# Patient Record
Sex: FEMALE | Race: BLACK OR AFRICAN AMERICAN | Employment: OTHER | URBAN - METROPOLITAN AREA
[De-identification: names, ages, dates, MRNs, and addresses within clinical notes are randomized per-mention and may not be internally consistent; named-entity substitution may affect disease eponyms.]

---

## 2017-11-29 ENCOUNTER — TRANSFERRED RECORDS (OUTPATIENT)
Dept: HEALTH INFORMATION MANAGEMENT | Facility: CLINIC | Age: 22
End: 2017-11-29

## 2017-11-29 LAB — PAP-ABSTRACT: NORMAL

## 2019-02-14 ENCOUNTER — PRENATAL OFFICE VISIT (OUTPATIENT)
Dept: MIDWIFE SERVICES | Facility: CLINIC | Age: 24
End: 2019-02-14
Payer: COMMERCIAL

## 2019-02-14 ENCOUNTER — PRENATAL OFFICE VISIT (OUTPATIENT)
Dept: NURSING | Facility: CLINIC | Age: 24
End: 2019-02-14
Payer: COMMERCIAL

## 2019-02-14 VITALS
DIASTOLIC BLOOD PRESSURE: 74 MMHG | BODY MASS INDEX: 26.43 KG/M2 | HEART RATE: 99 BPM | WEIGHT: 154 LBS | SYSTOLIC BLOOD PRESSURE: 103 MMHG

## 2019-02-14 VITALS
TEMPERATURE: 98.8 F | HEART RATE: 99 BPM | DIASTOLIC BLOOD PRESSURE: 74 MMHG | WEIGHT: 152.8 LBS | HEIGHT: 64 IN | BODY MASS INDEX: 26.09 KG/M2 | SYSTOLIC BLOOD PRESSURE: 103 MMHG

## 2019-02-14 DIAGNOSIS — Z34.00 SUPERVISION OF NORMAL FIRST PREGNANCY: Primary | ICD-10-CM

## 2019-02-14 DIAGNOSIS — Z34.02 ENCOUNTER FOR SUPERVISION OF NORMAL FIRST PREGNANCY, SECOND TRIMESTER: ICD-10-CM

## 2019-02-14 DIAGNOSIS — E55.9 VITAMIN D DEFICIENCY: Primary | ICD-10-CM

## 2019-02-14 DIAGNOSIS — Z34.90 ENCOUNTER FOR SUPERVISION OF NORMAL PREGNANCY, ANTEPARTUM, UNSPECIFIED GRAVIDITY: ICD-10-CM

## 2019-02-14 LAB
ALBUMIN UR-MCNC: NEGATIVE MG/DL
APPEARANCE UR: CLEAR
BILIRUB UR QL STRIP: NEGATIVE
COLOR UR AUTO: YELLOW
GLUCOSE UR STRIP-MCNC: 100 MG/DL
HGB UR QL STRIP: NEGATIVE
KETONES UR STRIP-MCNC: NEGATIVE MG/DL
LEUKOCYTE ESTERASE UR QL STRIP: ABNORMAL
NITRATE UR QL: NEGATIVE
PH UR STRIP: 5.5 PH (ref 5–7)
SOURCE: ABNORMAL
SP GR UR STRIP: <=1.005 (ref 1–1.03)
UROBILINOGEN UR STRIP-ACNC: 0.2 EU/DL (ref 0.2–1)

## 2019-02-14 PROCEDURE — 81003 URINALYSIS AUTO W/O SCOPE: CPT | Performed by: ADVANCED PRACTICE MIDWIFE

## 2019-02-14 PROCEDURE — 99207 ZZC NO CHARGE NURSE ONLY: CPT

## 2019-02-14 PROCEDURE — 87086 URINE CULTURE/COLONY COUNT: CPT | Performed by: ADVANCED PRACTICE MIDWIFE

## 2019-02-14 PROCEDURE — 99207 ZZC FIRST OB VISIT: CPT | Performed by: ADVANCED PRACTICE MIDWIFE

## 2019-02-14 RX ORDER — PNV NO.95/FERROUS FUM/FOLIC AC 28MG-0.8MG
1 TABLET ORAL DAILY
Refills: 12 | Status: ON HOLD | COMMUNITY
Start: 2018-12-05 | End: 2019-05-16

## 2019-02-14 RX ORDER — FERROUS GLUCONATE 324(38)MG
TABLET ORAL
Refills: 3 | Status: ON HOLD | COMMUNITY
Start: 2018-12-05 | End: 2019-05-16

## 2019-02-14 RX ORDER — CHOLECALCIFEROL (VITAMIN D3) 50 MCG
1 TABLET ORAL DAILY
Qty: 90 TABLET | Refills: 3 | Status: SHIPPED | OUTPATIENT
Start: 2019-02-14 | End: 2020-02-14

## 2019-02-14 SDOH — SOCIAL STABILITY: SOCIAL INSECURITY: WITHIN THE LAST YEAR, HAVE YOU BEEN HUMILIATED OR EMOTIONALLY ABUSED IN OTHER WAYS BY YOUR PARTNER OR EX-PARTNER?: NO

## 2019-02-14 SDOH — HEALTH STABILITY: PHYSICAL HEALTH: ON AVERAGE, HOW MANY MINUTES DO YOU ENGAGE IN EXERCISE AT THIS LEVEL?: 0 MIN

## 2019-02-14 SDOH — SOCIAL STABILITY: SOCIAL INSECURITY: WITHIN THE LAST YEAR, HAVE YOU BEEN AFRAID OF YOUR PARTNER OR EX-PARTNER?: NO

## 2019-02-14 SDOH — HEALTH STABILITY: MENTAL HEALTH
STRESS IS WHEN SOMEONE FEELS TENSE, NERVOUS, ANXIOUS, OR CAN'T SLEEP AT NIGHT BECAUSE THEIR MIND IS TROUBLED. HOW STRESSED ARE YOU?: NOT AT ALL

## 2019-02-14 SDOH — HEALTH STABILITY: PHYSICAL HEALTH: ON AVERAGE, HOW MANY DAYS PER WEEK DO YOU ENGAGE IN MODERATE TO STRENUOUS EXERCISE (LIKE A BRISK WALK)?: 0 DAYS

## 2019-02-14 SDOH — HEALTH STABILITY: MENTAL HEALTH: HOW OFTEN DO YOU HAVE A DRINK CONTAINING ALCOHOL?: NEVER

## 2019-02-14 SDOH — SOCIAL STABILITY: SOCIAL INSECURITY
WITHIN THE LAST YEAR, HAVE TO BEEN RAPED OR FORCED TO HAVE ANY KIND OF SEXUAL ACTIVITY BY YOUR PARTNER OR EX-PARTNER?: NO

## 2019-02-14 SDOH — SOCIAL STABILITY: SOCIAL INSECURITY
WITHIN THE LAST YEAR, HAVE YOU BEEN KICKED, HIT, SLAPPED, OR OTHERWISE PHYSICALLY HURT BY YOUR PARTNER OR EX-PARTNER?: NO

## 2019-02-14 SDOH — SOCIAL STABILITY: SOCIAL NETWORK: ARE YOU MARRIED, WIDOWED, DIVORCED, SEPARATED, NEVER MARRIED, OR LIVING WITH A PARTNER?: MARRIED

## 2019-02-14 ASSESSMENT — MIFFLIN-ST. JEOR: SCORE: 1423.1

## 2019-02-14 NOTE — PROGRESS NOTES
24w0d  Feeling well. Late to care. Had new OB teaching today. MFM ultrasound comprehensive ordered r/t late gestation. First baby. FOB, Mina here and supportive, speaks good english. Amina is learning english and understands a bunch, but uses an  for more complex medical questions. She reports having a healthy diet and cooks homemade foods. Mina states that she does not eat enough. We discussed weight gain and nutrition and exercise.     Will wait until after MFM ultrasound to make sure she is 24 wks and do GCT in 2 wks when she returns to clinic.     Amina Branham is a 23 year old female, -American and Somalian,     Pt presents to clinic today for a NOB visit.  Patient's last menstrual period was 2018.. Estimated Date of Delivery: 2019 is calculated from lmp alone     She has not had bleeding since her LMP.   She has not had nausea. Weight loss has not occurred.   This was a planned pregnancy.   WIL is involved,  Cesar     OTHER CONCERNS:     Pt's hx of HSV is negative    ============================================  PERSONAL/SOCIAL HISTORY  Lives lives with their family.  Employment: Student.  Her job involves light activity .  Exercises sporadic or irregular exercise  Pt denies abuse and feels safe in her home and relationships.     REVIEW OF SYSTEMS  C: NEGATIVE for fever, chills, change in weight  I: NEGATIVE for worrisome rashes, moles or lesions  E/M: NEGATIVE for ear, mouth and throat problems  R: NEGATIVE for significant cough or SOB  CV: NEGATIVE for chest pain, palpitations or peripheral edema  GI: NEGATIVE for nausea, abdominal pain, heartburn, or change in bowel habits  : NEGATIVE for frequency, dysuria, or hematuria  PSYCHIATRIC:  NEGATIVE for depression, anxiety or other mental health concerns    PHYSICAL EXAM:  /74   Pulse 99   Wt 69.9 kg (154 lb)   LMP 2018   BMI 26.43 kg/m    BMI- Body mass index is 26.43 kg/m ., RECOMMENDED WEIGHT GAIN:  15-25 lbs.  GENERAL:  Pleasant pregnant female, alert, cooperative and well groomed.  SKIN:  Warm and dry, without lesions or rashes  HEAD: Symmetrical features.  MOUTH:  Buccal mucosa pink, moist without lesions.  Teeth in good repair.    NECK:  Thyroid without enlargement and nodules.  Lymph nodes not palpable.   LUNGS:  Clear to auscultation.      HEART:  RRR without murmur.  ABDOMEN: Soft without masses , tenderness or organomegaly.  No CVA tenderness.  Uterus palpable at size equal to dates.  No scars noted..  MUSCULOSKELETAL:  Full range of motion  EXTREMITIES:  No edema. No significant varicosities.     PELVIC EXAM: Patient had many questions today. Will recommend pap at next visit.     GC/CHLAMYDIA CULTURE OBTAINED:PATIENT DECLINED    ASSESSMENT:  Intrauterine pregnancy at 24w0d weeks gestation.     (E55.9) Vitamin D deficiency  (primary encounter diagnosis)  Comment:   Plan: vitamin D3 (CHOLECALCIFEROL) 2000 units tablet             PLAN:  Oriented to CNM service.    Instructed on use of triage nurse line and contacting the on call CNM after hours for an urgent need such as fever, vagina bleeding, bladder or vaginal infection, rupture of membranes,  or term labor.      Discussed the indications, uses for and false positives for quad screen  and fetal survey ultrasound at 18-20 weeks gestation. Will inform us at the next visit if she wished to avail herself of these screens.    Instructed on best evidence for: weight gain for her weight and height for pregnancy; healthy diet; exercise and activity during pregnancy; and maintenance of a generally healthy lifestyle.     Discussed the harms, benefits, side effects and alternative therapies for current prescribed and OTC medications.    Darline Lopez CNM

## 2019-02-14 NOTE — PROGRESS NOTES
Important Information for Provider:     Patient  presents for new ob teaching and labs, first pregnancy, late care. Handouts reviewed and given. Ordered M ultrasound. Has NOB today with CNM    Prenatal OB Questionnaire  Patient supplied answers from flow sheet for:  Prenatal OB Questionnaire.  Past Medical History  Diabetes?: No  Hypertension : No  Heart disease, mitral valve prolapse or rheumatic fever?: No  An autoimmune disease such as lupus or rheumatoid arthritis?: No  Kidney disease or urinary tract infection?: No  Epilepsy, seizures or spells?: No  Migraine headaches?: No  A stroke or loss of function or sensation?: No  Any other neurological problems?: No  Have you ever been treated for depression?: No  Are you having problems with crying spells or loss of self-esteem?: No  Have you ever required psychiatric care?: No  Have you ever had hepatitis, liver disease or jaundice?: No  Have you been treated for blood clots in your veins, deep vein thromosis, inflammation in the veins, thrombosis, phlebitis, pulmonary embolism or varicosities?: No  Have you had excessive bleeding after surgery or dental work?: No  Do you bleed more than other women after a cut or scratch?: No  Do you have a history of anemia?: No  Have you ever had thyroid problems or taken thyroid medication?: No   Do you have any endocrine problems?: No  Have you ever been in a major accident or suffered serious trauma?: No  Within the last year, has anyone hit, slapped, kicked or otherwise hurt you?: No  In the last year, has anyone forced you to have sex when you didn't want to?: No    Past Medical History 2   Have you ever received a blood transfusion?: No  Would you refuse a blood transfusion if a doctor judged it to be medically necessary?: No   If you answered Yes, would you rather die than receive a blood transfusion?: No  If you answered Yes, is this for Lutheran reasons?: No  Does anyone in your home smoke?: No  Do you use tobacco  products?: No  Do you drink beer, wine or hard liquor?: No  Do you use any of the following: marijuana, speed, cocaine, heroin, hallucinogens or other drugs?: No   Is your blood type Rh negative?: No  Have you ever had abnormal antibodies in your blood?: No  Have you ever had asthma?: No  Have you ever had tuberculosis?: No  Do you have any allergies to drugs or over-the-counter medications?: No  Allergies: Dust Mites, Aspartame, Ethanol, Venlafaxine, Hydrochloride, Sertraline: No  Have you had any breast problems?: No  Have you ever ?: No  Have you had any gynecological surgical procedures such as cervical conization, a LEEP procedure, laser treatment, cryosurgery of the cervix or a dilation and curettage, etc?: No  Have you ever had any other surgical procedures?: No  Have you been hospitalized for a nonsurgical reason excluding normal delivery?: No  Have you ever had any anesthetic complications?: No  Have you ever had an abnormal pap smear?: No    Past Medical History (Continued)  Do you have a history of abnormalities of the uterus?: No  Did your mother take INGRIS or any other hormones when she was pregnant with you?: No  Did it take you more than a year to become pregnant?: No  Have you ever been evaluated or treated for infertility?: No  Is there a history of medical problems in your family, which you feel may be important to this pregnancy?: No  Do you have any other problems we have not asked about which you feel may be important to this pregnancy?: No    Symptoms since last menstrual period  Do you have any of the following symptoms: abdominal pain, blood in stools or urine, chest pain, shortness of breath, coughing or vomiting up blood, your heart racing or skipping beats, nausea and vomiting, pain on urination or vaginal discharge or bleed: (!) Yes  Will the patient be 35 years old or older at the time of delivery?: No    Has the patient, baby's father or anyone in either family had:  Thalassemia  (Turkish, Mexican, Mediterranean or  background only) and an MCV result less than 80?: No  Neural tube defect such as meningomyelocele, spina bifida or anencephaly?: No  Congenital heart defect?: No  Down's Syndrome?: No  Barrington-Sachs disease (Evangelical, Cajun, English-[place])?: No  Sickle cell disease or trait ()?: No  Hemophilia or other inherited problems of blood?: No  Muscular dystrophy?: No  Cystic fibrosis?: No  [place]'s chorea?: No  Mental retardation/autism?: No  If yes, was the person tested for fragile X?: No  Any other inherited genetic or chromosomal disorder?: No  Maternal metabolic disorder (e.g Insulin-dependent diabetes, PKU)?: No  A child with birth defects not listed above?: No  Recurrent pregnancy loss or stillbirth?: No   Has the patient had any medications/street drugs/alcohol since her last menstrual period?: No  Does the patient or baby's father have any other genetic risks?: No    Infection History   Do you object to being tested for Hepatitis B?: No  Do you object to being tested for HIV?: No   Do you feel that you are at high risk for coming in contact with the AIDS virus?: No  Have you ever been treated for tuberculosis?: No  Have you ever had a positive skin test for tuberculosis?: No  Do you live with someone who has tuberculosis?: No  Have you ever been exposed to tuberculosis?: No  Do you have genital herpes?: No  Does your partner have genital herpes?: No  Have you ever had gonorrhea, chlamydia, syphilis, venereal warts, trichomoniasis, pelvic inflammatory disease or any other sexually transmitted disease?: No  Do you know if you are a genital group B streptococcus carrier?: No  Have you had chicken pox/varicella?: No   Have you been vaccinated against chicken Pox?: No  Have you had any other infectious diseases?: No      Allergies as of 2/14/2019:    Allergies as of 02/14/2019     (No Known Allergies)       Current medications are:  Current Outpatient Medications    Medication Sig Dispense Refill     ferrous gluconate (FERGON) 324 (38 Fe) MG tablet TAKE 1 TABLET (324 MG) BY MOUTH DAILY.  3     Prenatal Vit-Fe Fumarate-FA (PRENATAL VITAMINS) 28-0.8 MG TABS Take 1 tablet by mouth daily  12         Early ultrasound screening tool:    Does patient have irregular periods?  No  Did patient use hormonal birth control in the three months prior to positive urine pregnancy test? No  Is the patient breastfeeding?  No  Is the patient 10 weeks or greater at time of education visit?  Yes

## 2019-02-15 ENCOUNTER — HEALTH MAINTENANCE LETTER (OUTPATIENT)
Age: 24
End: 2019-02-15

## 2019-02-15 LAB
BACTERIA SPEC CULT: NORMAL
SPECIMEN SOURCE: NORMAL

## 2019-02-19 ENCOUNTER — PRE VISIT (OUTPATIENT)
Dept: MATERNAL FETAL MEDICINE | Facility: CLINIC | Age: 24
End: 2019-02-19

## 2019-02-25 ENCOUNTER — OFFICE VISIT (OUTPATIENT)
Dept: MATERNAL FETAL MEDICINE | Facility: CLINIC | Age: 24
End: 2019-02-25
Attending: ADVANCED PRACTICE MIDWIFE
Payer: COMMERCIAL

## 2019-02-25 ENCOUNTER — HOSPITAL ENCOUNTER (OUTPATIENT)
Dept: ULTRASOUND IMAGING | Facility: CLINIC | Age: 24
Discharge: HOME | End: 2019-02-25
Attending: ADVANCED PRACTICE MIDWIFE | Admitting: ADVANCED PRACTICE MIDWIFE
Payer: COMMERCIAL

## 2019-02-25 DIAGNOSIS — O09.32 LATE PRENATAL CARE AFFECTING PREGNANCY IN SECOND TRIMESTER: Primary | ICD-10-CM

## 2019-02-25 DIAGNOSIS — O26.90 PREGNANCY RELATED CONDITION, ANTEPARTUM: ICD-10-CM

## 2019-02-25 PROCEDURE — 76811 OB US DETAILED SNGL FETUS: CPT

## 2019-02-25 NOTE — PROGRESS NOTES
Please see full imaging report from [facility] program under imaging tab.        Paul Dean MD  Maternal Fetal Medicine

## 2019-03-01 ENCOUNTER — PRENATAL OFFICE VISIT (OUTPATIENT)
Dept: MIDWIFE SERVICES | Facility: CLINIC | Age: 24
End: 2019-03-01
Payer: COMMERCIAL

## 2019-03-01 VITALS
HEART RATE: 94 BPM | DIASTOLIC BLOOD PRESSURE: 75 MMHG | TEMPERATURE: 98.8 F | SYSTOLIC BLOOD PRESSURE: 109 MMHG | WEIGHT: 154 LBS | BODY MASS INDEX: 26.43 KG/M2

## 2019-03-01 DIAGNOSIS — Z34.02 ENCOUNTER FOR SUPERVISION OF NORMAL FIRST PREGNANCY IN SECOND TRIMESTER: Primary | ICD-10-CM

## 2019-03-01 LAB
GLUCOSE 1H P 50 G GLC PO SERPL-MCNC: 106 MG/DL (ref 60–129)
HGB BLD-MCNC: 11.3 G/DL (ref 11.7–15.7)

## 2019-03-01 PROCEDURE — 99207 ZZC PRENATAL VISIT: CPT | Performed by: ADVANCED PRACTICE MIDWIFE

## 2019-03-01 PROCEDURE — 36415 COLL VENOUS BLD VENIPUNCTURE: CPT | Performed by: ADVANCED PRACTICE MIDWIFE

## 2019-03-01 PROCEDURE — T1013 SIGN LANG/ORAL INTERPRETER: HCPCS | Mod: U3 | Performed by: ADVANCED PRACTICE MIDWIFE

## 2019-03-01 PROCEDURE — 00000218 ZZHCL STATISTIC OBHBG - HEMOGLOBIN: Performed by: ADVANCED PRACTICE MIDWIFE

## 2019-03-01 PROCEDURE — 82950 GLUCOSE TEST: CPT | Performed by: ADVANCED PRACTICE MIDWIFE

## 2019-03-01 NOTE — PROGRESS NOTES
29w6d  Amina here with  today. She is having questions about why she had to go to [facility] for an ultrasound, and why they want to do another. Discussed the difficulty of dating a pregnancy when presenting late to care, and that the follow-up will help to confirm the dates that were determined. After a lengthy conversation, she verbalized understanding. Stressed the importance of going to [facility] for ultrasound, but also coming here for regular clinic visits. She verbalizes understanding. Declines Tdap today. Passed GCT, hgb 11.3. Baby is active, denies bleeding, leaking of fluid, regular contractions. Return to care 2 weeks.  Onesimo Rich CNM

## 2019-03-28 ENCOUNTER — HOSPITAL ENCOUNTER (OUTPATIENT)
Facility: CLINIC | Age: 24
End: 2019-03-28

## 2019-04-02 ENCOUNTER — PRENATAL OFFICE VISIT (OUTPATIENT)
Dept: MIDWIFE SERVICES | Facility: CLINIC | Age: 24
End: 2019-04-02
Payer: COMMERCIAL

## 2019-04-02 VITALS
BODY MASS INDEX: 26.61 KG/M2 | SYSTOLIC BLOOD PRESSURE: 109 MMHG | HEART RATE: 98 BPM | DIASTOLIC BLOOD PRESSURE: 86 MMHG | WEIGHT: 155 LBS

## 2019-04-02 DIAGNOSIS — Z60.3 LANGUAGE BARRIER: ICD-10-CM

## 2019-04-02 DIAGNOSIS — Z75.8 LANGUAGE BARRIER: ICD-10-CM

## 2019-04-02 DIAGNOSIS — Z23 NEED FOR TDAP VACCINATION: ICD-10-CM

## 2019-04-02 DIAGNOSIS — Z34.03 ENCOUNTER FOR SUPERVISION OF NORMAL FIRST PREGNANCY IN THIRD TRIMESTER: Primary | ICD-10-CM

## 2019-04-02 PROCEDURE — 99207 ZZC PRENATAL VISIT: CPT | Performed by: ADVANCED PRACTICE MIDWIFE

## 2019-04-02 SDOH — SOCIAL STABILITY - SOCIAL INSECURITY: ACCULTURATION DIFFICULTY: Z60.3

## 2019-04-02 NOTE — PROGRESS NOTES
34w3d  Patient feeling well. Positive fetal movement. Denies water leaking, vaginal bleeding, decreased fetal movement, contraction pain, or headaches.   Doing well. Has occasional stomach pain but only feels it about once per week. Feels like it might be related to the baby's position. Discussed it could be that or possible muscle separation, or GERD. She will continue to watch and if it gets worse will let us know. No other questions or concerns today.    was present but patient asked her to step out. She feels uncomfortable with translators because she feels like Turkmen women gossip too much and she does not want them talking about her in the community. We discussed HIPPA and what that means. She would prefer her  translates when needed but she also is able to communicate some. Will most likely keep translators for the clinic but will decline  for the hospital and birth.   Danger signs reviewed, pre-eclampsia signs and symptoms discussed.   Knows when to call triage and has phone numbers.   Follow up in 2 weeks for GBS and HGB.   Olivia Null CNM

## 2019-04-16 ENCOUNTER — PRENATAL OFFICE VISIT (OUTPATIENT)
Dept: MIDWIFE SERVICES | Facility: CLINIC | Age: 24
End: 2019-04-16
Payer: COMMERCIAL

## 2019-04-16 VITALS
BODY MASS INDEX: 26.78 KG/M2 | DIASTOLIC BLOOD PRESSURE: 84 MMHG | HEART RATE: 99 BPM | SYSTOLIC BLOOD PRESSURE: 113 MMHG | OXYGEN SATURATION: 100 % | WEIGHT: 156 LBS

## 2019-04-16 DIAGNOSIS — Z23 NEED FOR TDAP VACCINATION: ICD-10-CM

## 2019-04-16 DIAGNOSIS — K59.01 SLOW TRANSIT CONSTIPATION: ICD-10-CM

## 2019-04-16 DIAGNOSIS — Z34.03 ENCOUNTER FOR SUPERVISION OF NORMAL FIRST PREGNANCY IN THIRD TRIMESTER: Primary | ICD-10-CM

## 2019-04-16 LAB — HGB BLD-MCNC: 12.7 G/DL (ref 11.7–15.7)

## 2019-04-16 PROCEDURE — 87653 STREP B DNA AMP PROBE: CPT | Performed by: ADVANCED PRACTICE MIDWIFE

## 2019-04-16 PROCEDURE — 36415 COLL VENOUS BLD VENIPUNCTURE: CPT | Performed by: ADVANCED PRACTICE MIDWIFE

## 2019-04-16 PROCEDURE — 00000218 ZZHCL STATISTIC OBHBG - HEMOGLOBIN: Performed by: ADVANCED PRACTICE MIDWIFE

## 2019-04-16 PROCEDURE — 99207 ZZC PRENATAL VISIT: CPT | Performed by: ADVANCED PRACTICE MIDWIFE

## 2019-04-16 RX ORDER — SENNOSIDES A AND B 8.6 MG/1
1 TABLET, FILM COATED ORAL DAILY
Qty: 60 TABLET | Refills: 0 | Status: ON HOLD | OUTPATIENT
Start: 2019-04-16 | End: 2019-05-16

## 2019-04-16 NOTE — PROGRESS NOTES
36w3d  Feeling well. GBS and hgb today. C/o constipation. Rx given for senna for now PRN and postpartum. Reports good fetal movement. Denies leaking of fluid, vaginal bleeding, regular uterine contractions, headache or other concerns.  RTC in 1 wk PRIYANK

## 2019-04-18 LAB
GP B STREP DNA SPEC QL NAA+PROBE: POSITIVE
SPECIMEN SOURCE: ABNORMAL

## 2019-04-20 LAB
BACTERIA SPEC CULT: NORMAL
SPECIMEN SOURCE: NORMAL

## 2019-04-23 ENCOUNTER — PRENATAL OFFICE VISIT (OUTPATIENT)
Dept: MIDWIFE SERVICES | Facility: CLINIC | Age: 24
End: 2019-04-23
Payer: COMMERCIAL

## 2019-04-23 VITALS
WEIGHT: 156.3 LBS | OXYGEN SATURATION: 99 % | DIASTOLIC BLOOD PRESSURE: 80 MMHG | SYSTOLIC BLOOD PRESSURE: 100 MMHG | BODY MASS INDEX: 26.69 KG/M2 | HEIGHT: 64 IN | HEART RATE: 80 BPM

## 2019-04-23 DIAGNOSIS — Z34.03 ENCOUNTER FOR SUPERVISION OF NORMAL FIRST PREGNANCY IN THIRD TRIMESTER: Primary | ICD-10-CM

## 2019-04-23 DIAGNOSIS — Z23 NEED FOR TDAP VACCINATION: ICD-10-CM

## 2019-04-23 PROBLEM — O99.820 GBS (GROUP B STREPTOCOCCUS CARRIER), +RV CULTURE, CURRENTLY PREGNANT: Status: ACTIVE | Noted: 2019-04-23

## 2019-04-23 PROCEDURE — 99207 ZZC PRENATAL VISIT: CPT | Performed by: ADVANCED PRACTICE MIDWIFE

## 2019-04-23 ASSESSMENT — MIFFLIN-ST. JEOR: SCORE: 1438.97

## 2019-04-23 NOTE — RESULT ENCOUNTER NOTE
Dear Amina,    Your test results are attached below. The result of your Group Beta Strep (GBS) test was positive. This means that we recommend that you receive antibiotics when you are in labor to prevent your baby from getting an infection. GBS is common, about 1 in 4 women test positive to GBS in pregnancy. Having GBS should not effect your labor or the way in which you are planning to birth your baby. We can discuss GBS and our recommendations for labor at your next appointment.    If you have any questions, please contact me via [employer] or you can call our office at [phone].    Darline Mujica, SKY, CNM

## 2019-04-24 NOTE — PROGRESS NOTES
"Here with . Feeling well, no concerns. Baby is active. No regular contractions, LOF or bleeding. Discussed GBS results and recommendation for treatment while in labor. Pt declines for now because \"she doesn't like medicine\". She says every time in [place] when she took medication it made things worse. We discussed the risks of not treating and certain situations where treatment would be more strongly recommended (prolonged ROM, ROM without labor, fever or other signs of infection). She verbalizes understanding and seems open to the discussion but for now plans to decline. BSUS used to confirm cephalic presentation. RTC weekly. MML  "

## 2019-04-30 ENCOUNTER — TELEPHONE (OUTPATIENT)
Dept: MATERNAL FETAL MEDICINE | Facility: CLINIC | Age: 24
End: 2019-04-30

## 2019-04-30 ENCOUNTER — PRENATAL OFFICE VISIT (OUTPATIENT)
Dept: MIDWIFE SERVICES | Facility: CLINIC | Age: 24
End: 2019-04-30
Payer: COMMERCIAL

## 2019-04-30 VITALS
WEIGHT: 156 LBS | DIASTOLIC BLOOD PRESSURE: 78 MMHG | HEART RATE: 88 BPM | BODY MASS INDEX: 26.78 KG/M2 | OXYGEN SATURATION: 100 % | SYSTOLIC BLOOD PRESSURE: 102 MMHG

## 2019-04-30 DIAGNOSIS — O99.820 GBS (GROUP B STREPTOCOCCUS CARRIER), +RV CULTURE, CURRENTLY PREGNANT: ICD-10-CM

## 2019-04-30 DIAGNOSIS — Z34.03 ENCOUNTER FOR SUPERVISION OF NORMAL FIRST PREGNANCY IN THIRD TRIMESTER: Primary | ICD-10-CM

## 2019-04-30 PROCEDURE — 99207 ZZC PRENATAL VISIT: CPT | Performed by: ADVANCED PRACTICE MIDWIFE

## 2019-04-30 PROCEDURE — T1013 SIGN LANG/ORAL INTERPRETER: HCPCS | Mod: U3 | Performed by: ADVANCED PRACTICE MIDWIFE

## 2019-04-30 NOTE — TELEPHONE ENCOUNTER
Patient was seen in [facility] clinic on 2/25, a repeat US in 4 weeks was recommended, patient called to cancel scheduled 3/28/19 appointment stating that she would call back, no return call from patient. Schedulers called patient, messages left and patient did not return call. Removing order, referring clinic notified. , Jessica Dodge

## 2019-04-30 NOTE — PROGRESS NOTES
Here with  and FOB. Feeling well, no concerns. Baby is active. No regular contractions, LOF or bleeding. Has phone numbers and knows where to go when in labor. RTC weekly. MML

## 2019-05-09 ENCOUNTER — PRENATAL OFFICE VISIT (OUTPATIENT)
Dept: MIDWIFE SERVICES | Facility: CLINIC | Age: 24
End: 2019-05-09
Payer: COMMERCIAL

## 2019-05-09 VITALS — WEIGHT: 154.1 LBS | SYSTOLIC BLOOD PRESSURE: 102 MMHG | DIASTOLIC BLOOD PRESSURE: 64 MMHG | BODY MASS INDEX: 26.45 KG/M2

## 2019-05-09 DIAGNOSIS — Z34.83 ENCOUNTER FOR SUPERVISION OF OTHER NORMAL PREGNANCY, THIRD TRIMESTER: Primary | ICD-10-CM

## 2019-05-09 DIAGNOSIS — K59.01 SLOW TRANSIT CONSTIPATION: ICD-10-CM

## 2019-05-09 DIAGNOSIS — O48.0 POST-TERM PREGNANCY, 40-42 WEEKS OF GESTATION: ICD-10-CM

## 2019-05-09 PROCEDURE — 99207 ZZC PRENATAL VISIT: CPT | Performed by: ADVANCED PRACTICE MIDWIFE

## 2019-05-09 RX ORDER — POLYETHYLENE GLYCOL 3350 17 G/17G
1 POWDER, FOR SOLUTION ORAL DAILY
Qty: 30 PACKET | Refills: 1 | Status: ON HOLD | OUTPATIENT
Start: 2019-05-09 | End: 2019-05-16

## 2019-05-09 NOTE — PROGRESS NOTES
39w5d  Amina is here with her  and an  today. Her only complaint was some pain in her rectum yesterday that she felt twice. She has constipation, and her last BM was yesterday but only a very small, hard ball of stool. Discussed constipation and hemorrhoids. Patient agrees to take miralax, as she has taken it in the past and it worked. Prescription sent. Also discussed the fact that she had never followed up to have a second growth U/S to make sure her dating was correct. Discussed the importance of knowing her dates are correct, nga as she is coming to 40w. She agrees to have an U/S done to make sure that baby is growing ok, [facility] referral sent again. Also discussed post-dates testing. Patient would prefer post-dates testing instead of induction of labor. Will schedule LUIS FERNANDO and NST for next Fri at 40w6d. Gave patient triage number to call for concerns. Baby is active, denies bleeding, leaking of fluid, regular contractions, headaches. return to care 1 week.  Onesimo Rich CNM

## 2019-05-09 NOTE — Clinical Note
Pap smear done on this date: 11/29/17 (approximately), by this group: [facility], results were Normal.

## 2019-05-09 NOTE — NURSING NOTE
"Chief Complaint   Patient presents with     Prenatal Care     39+5       Initial /64   Wt 69.9 kg (154 lb 1.6 oz)   LMP 2018   BMI 26.45 kg/m   Estimated body mass index is 26.45 kg/m  as calculated from the following:    Height as of 19: 1.626 m (5' 4\").    Weight as of this encounter: 69.9 kg (154 lb 1.6 oz).  BP completed using cuff size: regular    Questioned patient about current smoking habits.  Pt. has never smoked.          The following HM Due: pap smear      The following patient reported/Care Every where data was sent to:  P ABSTRACT QUALITY INITIATIVES [[id number]]  Pap smear done on this date: 17 (approximately), by this group: [facility], results were Normal.       patient has appointment for today  Marla Grant                "

## 2019-05-15 ENCOUNTER — TELEPHONE (OUTPATIENT)
Dept: MIDWIFE SERVICES | Facility: CLINIC | Age: 24
End: 2019-05-15

## 2019-05-15 ENCOUNTER — HOSPITAL ENCOUNTER (INPATIENT)
Facility: CLINIC | Age: 24
LOS: 2 days | Discharge: HOME HEALTH | End: 2019-05-17
Attending: ADVANCED PRACTICE MIDWIFE | Admitting: ADVANCED PRACTICE MIDWIFE
Payer: COMMERCIAL

## 2019-05-15 ENCOUNTER — NURSE TRIAGE (OUTPATIENT)
Dept: NURSING | Facility: CLINIC | Age: 24
End: 2019-05-15

## 2019-05-15 DIAGNOSIS — K59.01 SLOW TRANSIT CONSTIPATION: ICD-10-CM

## 2019-05-15 LAB
ABO + RH BLD: NORMAL
ABO + RH BLD: NORMAL
AMPHETAMINES UR QL SCN: NEGATIVE
APTT PPP: 28 SEC (ref 22–37)
BASOPHILS # BLD AUTO: 0 10E9/L (ref 0–0.2)
BASOPHILS NFR BLD AUTO: 0 %
BLD GP AB SCN SERPL QL: NORMAL
BLOOD BANK CMNT PATIENT-IMP: NORMAL
CANNABINOIDS UR QL: NEGATIVE
COCAINE UR QL: NEGATIVE
DIFFERENTIAL METHOD BLD: ABNORMAL
EOSINOPHIL # BLD AUTO: 0 10E9/L (ref 0–0.7)
EOSINOPHIL NFR BLD AUTO: 0 %
ERYTHROCYTE [DISTWIDTH] IN BLOOD BY AUTOMATED COUNT: 14 % (ref 10–15)
FIBRINOGEN PPP-MCNC: 703 MG/DL (ref 200–420)
HCT VFR BLD AUTO: 36.8 % (ref 35–47)
HGB BLD-MCNC: 11.8 G/DL (ref 11.7–15.7)
IMM GRANULOCYTES # BLD: 0 10E9/L (ref 0–0.4)
IMM GRANULOCYTES NFR BLD: 0.3 %
INR PPP: 0.97 (ref 0.86–1.14)
LYMPHOCYTES # BLD AUTO: 0.5 10E9/L (ref 0.8–5.3)
LYMPHOCYTES NFR BLD AUTO: 7 %
MCH RBC QN AUTO: 25.3 PG (ref 26.5–33)
MCHC RBC AUTO-ENTMCNC: 32.1 G/DL (ref 31.5–36.5)
MCV RBC AUTO: 79 FL (ref 78–100)
MONOCYTES # BLD AUTO: 0.2 10E9/L (ref 0–1.3)
MONOCYTES NFR BLD AUTO: 2.6 %
NEUTROPHILS # BLD AUTO: 6.7 10E9/L (ref 1.6–8.3)
NEUTROPHILS NFR BLD AUTO: 90.1 %
NRBC # BLD AUTO: 0 10*3/UL
NRBC BLD AUTO-RTO: 0 /100
OPIATES UR QL SCN: NEGATIVE
PCP UR QL SCN: NEGATIVE
PLATELET # BLD AUTO: 164 10E9/L (ref 150–450)
RBC # BLD AUTO: 4.66 10E12/L (ref 3.8–5.2)
SPECIMEN EXP DATE BLD: NORMAL
WBC # BLD AUTO: 7.4 10E9/L (ref 4–11)

## 2019-05-15 PROCEDURE — 25800030 ZZH RX IP 258 OP 636: Performed by: ADVANCED PRACTICE MIDWIFE

## 2019-05-15 PROCEDURE — 85610 PROTHROMBIN TIME: CPT | Performed by: ADVANCED PRACTICE MIDWIFE

## 2019-05-15 PROCEDURE — 88307 TISSUE EXAM BY PATHOLOGIST: CPT | Mod: 26 | Performed by: ADVANCED PRACTICE MIDWIFE

## 2019-05-15 PROCEDURE — 0DQR0ZZ REPAIR ANAL SPHINCTER, OPEN APPROACH: ICD-10-PCS | Performed by: OBSTETRICS & GYNECOLOGY

## 2019-05-15 PROCEDURE — 12000001 ZZH R&B MED SURG/OB UMMC

## 2019-05-15 PROCEDURE — 88307 TISSUE EXAM BY PATHOLOGIST: CPT | Performed by: ADVANCED PRACTICE MIDWIFE

## 2019-05-15 PROCEDURE — 59400 OBSTETRICAL CARE: CPT | Performed by: ADVANCED PRACTICE MIDWIFE

## 2019-05-15 PROCEDURE — 80307 DRUG TEST PRSMV CHEM ANLYZR: CPT | Performed by: ADVANCED PRACTICE MIDWIFE

## 2019-05-15 PROCEDURE — 86780 TREPONEMA PALLIDUM: CPT | Performed by: ADVANCED PRACTICE MIDWIFE

## 2019-05-15 PROCEDURE — 85025 COMPLETE CBC W/AUTO DIFF WBC: CPT | Performed by: ADVANCED PRACTICE MIDWIFE

## 2019-05-15 PROCEDURE — 85384 FIBRINOGEN ACTIVITY: CPT | Performed by: ADVANCED PRACTICE MIDWIFE

## 2019-05-15 PROCEDURE — 86850 RBC ANTIBODY SCREEN: CPT | Performed by: ADVANCED PRACTICE MIDWIFE

## 2019-05-15 PROCEDURE — 25000132 ZZH RX MED GY IP 250 OP 250 PS 637: Performed by: ADVANCED PRACTICE MIDWIFE

## 2019-05-15 PROCEDURE — 72200001 ZZH LABOR CARE VAGINAL DELIVERY SINGLE

## 2019-05-15 PROCEDURE — G0463 HOSPITAL OUTPT CLINIC VISIT: HCPCS

## 2019-05-15 PROCEDURE — 86901 BLOOD TYPING SEROLOGIC RH(D): CPT | Performed by: ADVANCED PRACTICE MIDWIFE

## 2019-05-15 PROCEDURE — 86900 BLOOD TYPING SEROLOGIC ABO: CPT | Performed by: ADVANCED PRACTICE MIDWIFE

## 2019-05-15 PROCEDURE — 25000125 ZZHC RX 250

## 2019-05-15 PROCEDURE — 85730 THROMBOPLASTIN TIME PARTIAL: CPT | Performed by: ADVANCED PRACTICE MIDWIFE

## 2019-05-15 RX ORDER — OXYTOCIN/0.9 % SODIUM CHLORIDE 30/500 ML
340 PLASTIC BAG, INJECTION (ML) INTRAVENOUS CONTINUOUS PRN
Status: DISCONTINUED | OUTPATIENT
Start: 2019-05-15 | End: 2019-05-17 | Stop reason: HOSPADM

## 2019-05-15 RX ORDER — IBUPROFEN 800 MG/1
800 TABLET, FILM COATED ORAL EVERY 6 HOURS PRN
Status: DISCONTINUED | OUTPATIENT
Start: 2019-05-15 | End: 2019-05-17 | Stop reason: HOSPADM

## 2019-05-15 RX ORDER — OXYTOCIN 10 [USP'U]/ML
10 INJECTION, SOLUTION INTRAMUSCULAR; INTRAVENOUS
Status: DISCONTINUED | OUTPATIENT
Start: 2019-05-15 | End: 2019-05-15

## 2019-05-15 RX ORDER — ONDANSETRON 2 MG/ML
4 INJECTION INTRAMUSCULAR; INTRAVENOUS EVERY 6 HOURS PRN
Status: DISCONTINUED | OUTPATIENT
Start: 2019-05-15 | End: 2019-05-15

## 2019-05-15 RX ORDER — NALOXONE HYDROCHLORIDE 0.4 MG/ML
.1-.4 INJECTION, SOLUTION INTRAMUSCULAR; INTRAVENOUS; SUBCUTANEOUS
Status: DISCONTINUED | OUTPATIENT
Start: 2019-05-15 | End: 2019-05-17 | Stop reason: HOSPADM

## 2019-05-15 RX ORDER — FERROUS GLUCONATE 324(38)MG
324 TABLET ORAL
Status: DISCONTINUED | OUTPATIENT
Start: 2019-05-16 | End: 2019-05-17 | Stop reason: HOSPADM

## 2019-05-15 RX ORDER — ACETAMINOPHEN 325 MG/1
650 TABLET ORAL EVERY 4 HOURS PRN
Status: DISCONTINUED | OUTPATIENT
Start: 2019-05-15 | End: 2019-05-15

## 2019-05-15 RX ORDER — OXYCODONE AND ACETAMINOPHEN 5; 325 MG/1; MG/1
1 TABLET ORAL
Status: DISCONTINUED | OUTPATIENT
Start: 2019-05-15 | End: 2019-05-15

## 2019-05-15 RX ORDER — OXYTOCIN/0.9 % SODIUM CHLORIDE 30/500 ML
PLASTIC BAG, INJECTION (ML) INTRAVENOUS
Status: DISCONTINUED
Start: 2019-05-15 | End: 2019-05-15 | Stop reason: HOSPADM

## 2019-05-15 RX ORDER — OXYTOCIN 10 [USP'U]/ML
10 INJECTION, SOLUTION INTRAMUSCULAR; INTRAVENOUS
Status: DISCONTINUED | OUTPATIENT
Start: 2019-05-15 | End: 2019-05-17 | Stop reason: HOSPADM

## 2019-05-15 RX ORDER — NALOXONE HYDROCHLORIDE 0.4 MG/ML
.1-.4 INJECTION, SOLUTION INTRAMUSCULAR; INTRAVENOUS; SUBCUTANEOUS
Status: DISCONTINUED | OUTPATIENT
Start: 2019-05-15 | End: 2019-05-15

## 2019-05-15 RX ORDER — CARBOPROST TROMETHAMINE 250 UG/ML
250 INJECTION, SOLUTION INTRAMUSCULAR
Status: DISCONTINUED | OUTPATIENT
Start: 2019-05-15 | End: 2019-05-15

## 2019-05-15 RX ORDER — OXYTOCIN/0.9 % SODIUM CHLORIDE 30/500 ML
100 PLASTIC BAG, INJECTION (ML) INTRAVENOUS CONTINUOUS
Status: DISCONTINUED | OUTPATIENT
Start: 2019-05-15 | End: 2019-05-17 | Stop reason: HOSPADM

## 2019-05-15 RX ORDER — SODIUM CHLORIDE, SODIUM LACTATE, POTASSIUM CHLORIDE, CALCIUM CHLORIDE 600; 310; 30; 20 MG/100ML; MG/100ML; MG/100ML; MG/100ML
INJECTION, SOLUTION INTRAVENOUS CONTINUOUS
Status: DISCONTINUED | OUTPATIENT
Start: 2019-05-15 | End: 2019-05-15

## 2019-05-15 RX ORDER — ACETAMINOPHEN 325 MG/1
650 TABLET ORAL EVERY 4 HOURS PRN
Status: DISCONTINUED | OUTPATIENT
Start: 2019-05-15 | End: 2019-05-17 | Stop reason: HOSPADM

## 2019-05-15 RX ORDER — SODIUM CHLORIDE, SODIUM LACTATE, POTASSIUM CHLORIDE, CALCIUM CHLORIDE 600; 310; 30; 20 MG/100ML; MG/100ML; MG/100ML; MG/100ML
INJECTION, SOLUTION INTRAVENOUS
Status: DISCONTINUED
Start: 2019-05-15 | End: 2019-05-15 | Stop reason: HOSPADM

## 2019-05-15 RX ORDER — OXYTOCIN/0.9 % SODIUM CHLORIDE 30/500 ML
100-340 PLASTIC BAG, INJECTION (ML) INTRAVENOUS CONTINUOUS PRN
Status: COMPLETED | OUTPATIENT
Start: 2019-05-15 | End: 2019-05-15

## 2019-05-15 RX ORDER — BISACODYL 10 MG
10 SUPPOSITORY, RECTAL RECTAL DAILY PRN
Status: DISCONTINUED | OUTPATIENT
Start: 2019-05-17 | End: 2019-05-17 | Stop reason: HOSPADM

## 2019-05-15 RX ORDER — PRENATAL VIT/IRON FUM/FOLIC AC 27MG-0.8MG
1 TABLET ORAL DAILY
Status: DISCONTINUED | OUTPATIENT
Start: 2019-05-15 | End: 2019-05-17 | Stop reason: HOSPADM

## 2019-05-15 RX ORDER — OXYTOCIN 10 [USP'U]/ML
INJECTION, SOLUTION INTRAMUSCULAR; INTRAVENOUS
Status: DISCONTINUED
Start: 2019-05-15 | End: 2019-05-15 | Stop reason: WASHOUT

## 2019-05-15 RX ORDER — LIDOCAINE HYDROCHLORIDE 10 MG/ML
INJECTION, SOLUTION EPIDURAL; INFILTRATION; INTRACAUDAL; PERINEURAL
Status: DISCONTINUED
Start: 2019-05-15 | End: 2019-05-15 | Stop reason: HOSPADM

## 2019-05-15 RX ORDER — AMOXICILLIN 250 MG
2 CAPSULE ORAL 2 TIMES DAILY
Status: DISCONTINUED | OUTPATIENT
Start: 2019-05-15 | End: 2019-05-17 | Stop reason: HOSPADM

## 2019-05-15 RX ORDER — MISOPROSTOL 200 UG/1
TABLET ORAL
Status: DISCONTINUED
Start: 2019-05-15 | End: 2019-05-15 | Stop reason: HOSPADM

## 2019-05-15 RX ORDER — PENICILLIN G POTASSIUM 5000000 [IU]/1
5 INJECTION, POWDER, FOR SOLUTION INTRAMUSCULAR; INTRAVENOUS ONCE
Status: DISCONTINUED | OUTPATIENT
Start: 2019-05-15 | End: 2019-05-15

## 2019-05-15 RX ORDER — IBUPROFEN 800 MG/1
800 TABLET, FILM COATED ORAL
Status: DISCONTINUED | OUTPATIENT
Start: 2019-05-15 | End: 2019-05-15

## 2019-05-15 RX ORDER — LANOLIN 100 %
OINTMENT (GRAM) TOPICAL
Status: DISCONTINUED | OUTPATIENT
Start: 2019-05-15 | End: 2019-05-17 | Stop reason: HOSPADM

## 2019-05-15 RX ORDER — HYDROCORTISONE 2.5 %
CREAM (GRAM) TOPICAL 3 TIMES DAILY PRN
Status: DISCONTINUED | OUTPATIENT
Start: 2019-05-15 | End: 2019-05-17 | Stop reason: HOSPADM

## 2019-05-15 RX ORDER — AMOXICILLIN 250 MG
1 CAPSULE ORAL 2 TIMES DAILY
Status: DISCONTINUED | OUTPATIENT
Start: 2019-05-15 | End: 2019-05-17 | Stop reason: HOSPADM

## 2019-05-15 RX ORDER — FENTANYL CITRATE 50 UG/ML
50-100 INJECTION, SOLUTION INTRAMUSCULAR; INTRAVENOUS
Status: DISCONTINUED | OUTPATIENT
Start: 2019-05-15 | End: 2019-05-15

## 2019-05-15 RX ORDER — METHYLERGONOVINE MALEATE 0.2 MG/ML
200 INJECTION INTRAVENOUS
Status: DISCONTINUED | OUTPATIENT
Start: 2019-05-15 | End: 2019-05-15

## 2019-05-15 RX ADMIN — Medication 340 ML/HR: at 16:00

## 2019-05-15 RX ADMIN — OXYTOCIN-SODIUM CHLORIDE 0.9% IV SOLN 30 UNIT/500ML 340 ML/HR: 30-0.9/5 SOLUTION at 16:00

## 2019-05-15 RX ADMIN — SODIUM CHLORIDE, POTASSIUM CHLORIDE, SODIUM LACTATE AND CALCIUM CHLORIDE 500 ML: 600; 310; 30; 20 INJECTION, SOLUTION INTRAVENOUS at 15:15

## 2019-05-15 RX ADMIN — LIDOCAINE HYDROCHLORIDE 20 ML: 10 INJECTION, SOLUTION EPIDURAL; INFILTRATION; INTRACAUDAL; PERINEURAL at 16:10

## 2019-05-15 RX ADMIN — IBUPROFEN 800 MG: 800 TABLET ORAL at 18:14

## 2019-05-15 NOTE — PLAN OF CARE
Data: Patient presented to [facility] at 1445.   Reason for maternal/fetal assessment per patient is Laboring  .  Patient is a . Prenatal record reviewed.      OB History    Para Term  AB Living   1 1 1 0 0 1   SAB TAB Ectopic Multiple Live Births   0 0 0 0 1      # Outcome Date GA Lbr Manohar/2nd Weight Sex Delivery Anes PTL Lv   1 Term 05/15/19 40w4d 06:20 / 00:37 2.72 kg (5 lb 15.9 oz) M Vag-Spont None N TODD      Complications: Excessive Vaginal Bleeding, GBS      Name: KAREL,MALE-GIOVANNI      Apgar1: 8  Apgar5: 9   . Medical history:   Past Medical History:   Diagnosis Date     NO ACTIVE PROBLEMS    . Gestational Age 40w4d. VSS. Fetal movement present. Patient denies cramping, backache, vaginal discharge, pelvic pressure, UTI symptoms, GI problems, bloody show, vaginal bleeding, edema, headache, visual disturbances, epigastric or URQ pain, abdominal pain, rupture of membranes. Support persons Mina- , and sister are present.  Action: Verbal consent for EFM. Triage assessment completed. EFM applied for fetal assessment with labor. Uterine assessment done- see doc flow. Fetal assessment: see doc flow. CNM at bedside and aware of decels. Response: JESS Rich CNM informed of arrival with labor, decels, and bleeding. Plan per provider is admit to labor- anticipate . Patient verbalized agreement with plan. Patient transferred to room 456 via cart  oriented to room and call light.

## 2019-05-15 NOTE — L&D DELIVERY NOTE
OB Vaginal Delivery Note    Amina Branham MRN# [id number]   Age: 23 year old YOB: 1995     [facility] after her water broke at home around 1430. She has been kandis regularly since 7am. She had a fair amount of vaginal bleeding upon arrival, concerning for abruption. FHR with variable decelerations, but moderate variability throughout labor. She was 8/100/+1 upon arrival, and progressed quickly to . She had a partial third degree laceration which was repaired by Dr. Chang. Her friend and  were with her to support her during this beautiful birth.  GA: 40w4d  GP:   Labor Complications: Excessive Vaginal Bleeding;GBS   EBL:    mL  QBL: 669 mL  Delivery Type: Vaginal, Spontaneous   ROM to Delivery Time: 1h 22m  [place] Weight: 2.72 kg (5 lb 15.9 oz)    1 Minute 5 Minute 10 Minute   Apgar Totals: 8    9          YOHANNES LEONARDO     Delivery Details:  Amina Branham, a 23 year old  female delivered a viable infant with apgars of 8   and 9  . Patient was fully dilated and pushing after 6  hours 20  minutes in active labor. Delivery was via vaginal, spontaneous  to a sterile field under none  anesthesia. Infant delivered in vertex  left  occiput  anterior  position. Anterior and posterior shoulders delivered without difficulty. The cord was clamped, cut twice and 3 vessels  were noted. Cord blood was obtained in routine fashion with the following disposition: lab .      Cord complications: none   Placenta delivered at 5/15/2019  4:10 PM . Placental disposition was Pathology . Fundal massage performed and fundus found to be firm.     Episiotomy: none    Perineum, vagina, cervix were inspected, and the following lacerations were noted:   Perineal lacerations: 3rd                     Any lacerations were repaired in the usual fashion using 3.0 Vicryl. Vaginal repair done by Dr. Chang.    Excellent hemostasis was noted. Needle count correct. Infant and patient in  delivery room in good and stable condition.        Labor Event Times    Labor onset date:  5/15/19 Onset time:   9:00 AM   Dilation complete date:  5/15/19 Complete time:   3:20 PM   Start pushing date/time:  5/15/2019 1520      Labor Length    1st Stage (hrs):  6 (min):  20   2nd Stage (hrs):  0 (min):  37   3rd Stage (hrs):  0 (min):  13      Labor Events     labor?:  No   steroids:  None  Labor Type:  Spontaneous     Antibiotics received during labor?:  No     Rupture identifier:  Sac 1  Rupture date/time: 5/15/19 1435   Rupture type:  Spontaneous rupture of membranes occuring during spontaneous labor or augmentation  Fluid color:  Clear     Delivery/Placenta Date and Time    Delivery Date:  5/15/19 Delivery Time:   3:57 PM   Placenta Date/Time:  5/15/2019  4:10 PM  Oxytocin given at the time of delivery:  after delivery of baby     Vaginal Counts     Initial count performed by 2 team members:   Two Team Members   LATASHA Lee CNM       Needles Suture [place] Sponges Instruments   Initial counts 2  5    Added to count  1     Final counts       Placed during labor Accounted for at the end of labor           Apgars    Living status:  Living   1 Minute 5 Minute 10 Minute 15 Minute 20 Minute   Skin color: 0  1       Heart rate: 2  2       Reflex irritability: 2  2       Muscle tone: 2  2       Respiratory effort: 2  2       Total: 8  9       Apgars assigned by:  LAINE BRADEN/ GANESH ALMANZAR CNP     Cord    Vessels:  3 Vessels Complications:  None   Cord Blood Disposition:  Lab Gases Sent?:  No       Resuscitation     Care at Delivery:  NNP Delivery Note    Asked by Onesimo Rich CNM to attend the delivery of this term, male infant with a gestational age of 40 4/7 weeks secondary to decelerations and meconium stained fluid. Infant had spontaneous respirations at birth. He was placed on mothers abdomen. Delayed cord clamping done for 1 minute. Brought to radiant  "warmer, dried and stimulated. Gross PE is WNL. Care transferred to the labor and delivery nurse.    Kaitlyn Richard SAMPSON 5/15/2019  4:17 PM  Nery ALMANZAR CNP        Measurements    Weight:  5 lb 15.9 oz Length:  1' 7.5\"   Head circumference:  33.7 cm       Skin to Skin and Feeding Plan    Skin to skin initiation date/time:     Skin to skin end date/time:     Reason skin to skin not initiated:  Patient Refused  Breastfeeding initiated date/time:  5/15/2019 1643  How do you plan to feed your baby:  Breastfeeding     Labor Events and Shoulder Dystocia    Fetal Tracing Prior to Delivery:  Category 2  Fetal Tracing Comments:  decelerations to 80-90's with pushing     Delivery (Maternal) (Provider to Complete) ([id number])    Episiotomy:  None  Perineal lacerations:  3rd Repaired?:  Yes   Vaginal laceration?:  No    Cervical laceration?:  No       Blood Loss  Mother: Amina Branham #[id number]   Start of Mother's Information    IO Blood Loss  05/15/19 0900 - 05/15/19 1805    Total QBL Blood Loss (mL) Hospital Encounter 669 mL    Total  669 mL         End of Mother's Information  Mother: Amina Branham #[id number]         Delivery - Provider to Complete ([id number])    Delivering clinician:  Onesimo Rich CNM CNM Care:  Exclusive CNM care in labor  Attempted Delivery Types (Choose all that apply):  Spontaneous Vaginal Delivery  Delivery Type (Choose the 1 that will go to the Birth History):  Vaginal, Spontaneous   Other personnel:   Provider Role   Alysha Payne RN          Placenta    Delayed Cord Clamping:  Done  Date/Time:  5/15/2019  4:10 PM  Removal:  Spontaneous  Disposition:  Pathology     Anesthesia    Method:  None          Presentation and Position    Presentation:  Vertex  Position:  Left Occiput Anterior           Onesimo Rich CNM  "

## 2019-05-15 NOTE — TELEPHONE ENCOUNTER
Patient's friend calling stating that htye are on the way to the hospital now as her water broke. Informed patient that L&D is on a case to case basis now. Will page the on call midwife now to call her back with further instructions. Please call [phone].  Angela Wen

## 2019-05-15 NOTE — TELEPHONE ENCOUNTER
Called her back.  She reports just a small amount of blood when she wipes.  Also describes mild irregular contractions and cramping in her back.  We discussed s/s of early labor.  She says that she doesn't notice when the baby moved last.  I asked her to come in for evaluation and an NST.  She declined.  She said that she is going to eat breakfast and watch for fetal movements.  She will call the midwife back and let her know if the baby is moving and how she is doing.  Again, I stressed the importance of normal fetal movements.  If baby is not moving, Amina should come in right away.  Amina's  was interpreting and they both verbalized understanding and agreement, but declined to come in now.  Midwife on call today (Onesimo) is away of this this patient.

## 2019-05-15 NOTE — TELEPHONE ENCOUNTER
6:57 a.m. Paged Yanna Mak to call  back directly.  I paged the on call HCP to talk with the patient directly. I advised the patient to call back if they have not heard from the on call HCP within 30 minutes.  Today is their due date. She has vaginal bleeding and contractions.  is translating and answering the questions.  Chelle Valenzuela RN-[facility]        Reason for Disposition    Abdominal pain or having contractions    Additional Information    Negative: Passed out (i.e., lost consciousness, collapsed and was not responding)    Negative: Shock suspected (e.g., cold/pale/clammy skin, too weak to stand, low BP, rapid pulse)    Negative: Difficult to awaken or acting confused (e.g., disoriented, slurred speech)    Negative: SEVERE vaginal bleeding (e.g., continuous red blood from vagina, large blood clots)    Negative: [1] SEVERE abdominal pain (e.g., excruciating) AND [2] constant AND [3] present > 1 hour    Negative: Sounds like a life-threatening emergency to the triager    Negative: [1] Vaginal bleeding AND [2] pregnant < 20 weeks    Negative: MILD-MODERATE vaginal bleeding (i.e., small to medium clots; like mild menstrual period)    Protocols used: PREGNANCY - VAGINAL BLEEDING GREATER THAN 20 WEEKS SINARADHA

## 2019-05-15 NOTE — H&P
ADMIT NOTE  =================  40w4d  Giovanni Branham is a 23 year old female     with an Patient's last menstrual period was 2018. and Estimated Date of Delivery: Data Unavailable is [facility]/2019 around 1450 PM in active labor.   Fetal movement- active  ROM- yes, small clear   GBS- positive    HPI  ================  Giovanni came to the [facility] after her waterbroke at home around 1430. She started having regular contractions this morning around 7am, and they became worse around 9am. She noticed some bleeding with wiping this morning, but there was more bleeding after her water broke. She is feeling some pressure in her rectum with contractions.  FOB- is involved, Mina  Other labor support- a friend is here with her    PRENATAL COURSE  =================  Prenatal course was essentially uncomplicated    HISTORIES  ============  No Known Allergies  Past Medical History:   Diagnosis Date     NO ACTIVE PROBLEMS      Past Surgical History:   Procedure Laterality Date     NO HISTORY OF SURGERY     .  No family history on file.  Social History     Tobacco Use     Smoking status: Never Smoker     Smokeless tobacco: Never Used   Substance Use Topics     Alcohol use: No     Frequency: Never     OB History    Para Term  AB Living   1 1 1 0 0 1   SAB TAB Ectopic Multiple Live Births   0 0 0 0 1      # Outcome Date GA Lbr Manohar/2nd Weight Sex Delivery Anes PTL Lv   1 Term 05/15/19 40w4d 06:20 / 00:37  M Vag-Spont None N TODD      Complications: Excessive Vaginal Bleeding, GBS      Name: KAREL,MALE-GIOVANNI      Apgar1: 8  Apgar5: 9        LABS:   ===========  Rhogam not indicated  Lab Results   Component Value Date    ABO B 05/15/2019       Lab Results   Component Value Date    RH Pos 05/15/2019     No results found for: RUBELLAABIGG   No results found for: RPR  No results found for: HIV  Lab Results   Component Value Date    HGB 11.8 05/15/2019      No results found for: HEPBANG  Lab  Results   Component Value Date    GBS Positive 2019       ROS  =========  Pt denies significant respiratory, cardiovacular, GI, or muscular/skeletalcomplaints.    See RN data base ROS.     PHYSICAL EXAM:  ===============  Blood pressure 112/71, temperature 98.1  F (36.7  C), temperature source Oral, resp. rate 16, last menstrual period 2018, unknown if currently breastfeeding.  General appearance: uncomfortable with contractions  Heart: RRR without murmur  Lungs: clear to auscultation   Neuro: denies headache and visual disturbances  Psych: Mentation normal and bright   Legs: 2+/2+, no clonus, no edema      Abdomen: gravid, single vertex fetus, non-tender between contractions.  EFW-  6.5 lbs.   CONTACTIONS: Contractions every 2-4 minutes.  Palpate: strong  FETAL HEART TONES: baseline 125 with moderate FHR variability and  pos accelerations. Variable decelerations present.      PELVIC EXAM: 8/ 100%/ Anterior/ soft/ +1   ANDREW SCORE: 13  BLOODY SHOW: yes-having vaginal bleeding with some clots   ROM: Yes  FLUID: clear  AMNISURE: not done    ASSESSMENT:  ==============  IUP @ 40w4d in in transition labor   NST NON-REACTIVE  CST NOT DONE   Fetal Heart rate tracing Category category two  GBS- positive  Vaginal bleeding with some clots     PLAN:  ===========  Abruption labs drawn  Admit - see IP orders  Prophylactic antibiotic for + GBS status-not done due to patient's advanced labor status.  Anticipate      Onesimo Rich CNM

## 2019-05-16 LAB
HGB BLD-MCNC: 9.4 G/DL (ref 11.7–15.7)
T PALLIDUM AB SER QL: NONREACTIVE

## 2019-05-16 PROCEDURE — 25000132 ZZH RX MED GY IP 250 OP 250 PS 637: Performed by: ADVANCED PRACTICE MIDWIFE

## 2019-05-16 PROCEDURE — 36415 COLL VENOUS BLD VENIPUNCTURE: CPT | Performed by: ADVANCED PRACTICE MIDWIFE

## 2019-05-16 PROCEDURE — 85018 HEMOGLOBIN: CPT | Performed by: ADVANCED PRACTICE MIDWIFE

## 2019-05-16 PROCEDURE — 12000001 ZZH R&B MED SURG/OB UMMC

## 2019-05-16 RX ORDER — IBUPROFEN 600 MG/1
600 TABLET, FILM COATED ORAL EVERY 6 HOURS PRN
Qty: 60 TABLET | Refills: 0 | Status: SHIPPED | OUTPATIENT
Start: 2019-05-16 | End: 2020-05-15

## 2019-05-16 RX ORDER — FERROUS SULFATE 325(65) MG
325 TABLET ORAL
Qty: 100 TABLET | Refills: 0 | Status: SHIPPED | OUTPATIENT
Start: 2019-05-16

## 2019-05-16 RX ORDER — ACETAMINOPHEN 325 MG/1
650 TABLET ORAL EVERY 6 HOURS PRN
Qty: 100 TABLET | Refills: 0 | Status: SHIPPED | OUTPATIENT
Start: 2019-05-16 | End: 2020-05-15

## 2019-05-16 RX ORDER — SENNOSIDES A AND B 8.6 MG/1
1 TABLET, FILM COATED ORAL DAILY
Qty: 60 TABLET | Refills: 0 | Status: SHIPPED | OUTPATIENT
Start: 2019-05-16

## 2019-05-16 RX ORDER — PNV NO.95/FERROUS FUM/FOLIC AC 28MG-0.8MG
1 TABLET ORAL DAILY
Qty: 100 TABLET | Refills: 3 | Status: SHIPPED | OUTPATIENT
Start: 2019-05-16

## 2019-05-16 RX ADMIN — PRENATAL VIT W/ FE FUMARATE-FA TAB 27-0.8 MG 1 TABLET: 27-0.8 TAB at 10:35

## 2019-05-16 RX ADMIN — FERROUS GLUCONATE 324 MG: 324 TABLET ORAL at 10:35

## 2019-05-16 RX ADMIN — SENNOSIDES AND DOCUSATE SODIUM 2 TABLET: 8.6; 5 TABLET ORAL at 10:35

## 2019-05-16 RX ADMIN — SENNOSIDES AND DOCUSATE SODIUM 2 TABLET: 8.6; 5 TABLET ORAL at 21:43

## 2019-05-16 RX ADMIN — IBUPROFEN 800 MG: 800 TABLET ORAL at 10:35

## 2019-05-16 NOTE — LACTATION NOTE
This note was copied from a baby's chart.  Consult for: First time mom breastfeeding.    History: Vaginal delivery @ 40w4d, AGA infant @ 5# 15.9 oz. Birthweight (8.54 percentile), less than 24 hours old.  No significant medical history for mom.    Breast exam of mom: Soft, symmetrical, deep pendulous breasts with intact, everted (with stimulation) nipples bilaterally.  Amina reports early tenderness but minimal growth during pregnancy.     Oral exam of baby:  Divot at center of upper gumline and prominent upper frenulum but lip lifts well (mom also has gap between her upper teeth). Normal sucking pattern and tongue extension beyond gumline when sucking on finger.     Feeding assessment:  After initiating suck on finger, infant latched well with assist, sustained feeding for >30 minutes with intermittent nutritive suck and swallowing, mom report comfortable latch.     Education provided: Discussed positioning & using pillows/blankets for support, anatomy of breast and infant mouth for feedings, ways to compress areola and aim jerica to get deep latch, breast compressions prn during feedings to enhance milk transfer, encouraged skin to skin especially if not cueing to feed, demonstrate how to do breast massage and hand expression. Left ascom number on whiteboard for help prn with second side and for nurse to show her how to do hand expression after feeding.     Plan: Encourage frequent skin to skin especially if disinterested in feeding. Feeding attempts on cue, with RN assist to latch prn & goal of 8 to 12 feedings in 24 hours, watch for early cues & no longer than 3 hours between feedings. Begin supplements (mom's milk if available, formula or donor milk as mom allows if not) after each feeding according to guidelines, please give by alternative feeding method. Hand express after each feeding until milk is in and hands on pumping any time using extra milk (not mom's) for supplement.     Please recommend follow up  with outpatient LC within a week of discharge for support with feedings and milk supply for baby <6#.    Supplement Guidelines for infants <37 weeks gestation or < 6 lbs at birth per the [facility] Methods for the  Infant (35-42 weeks) Policy ([facility] Guidelines):  Infants <37 weeks OR <6 pounds   Birth-24 hours of age: 5ml (1 tsp) every 2 - 3 hours, at least 8 times in 24 hours   24-48 hours of age: 10 ml (2 tsp) every 2 - 3 hours, at least 8 times in 24 hours   48-72 hours of age: 15 ml (3 tsp) every 2 - 3 hours, at least 8 times in 24 hours

## 2019-05-16 NOTE — PROVIDER NOTIFICATION
05/15/19 5415   Comments   Comments CNM at bedside   Pt arrived laboring, ruptured, and bleeding. CNM called to room to evaluate. EFM placed. FHTs in the 90s confirmed with maternal SpO2 monitor. Repositioned and FHTs improved. Per CNM SVE- 8/100/+1. Plan to transfer to room 456, place PIV, and labs including coags due to bleeding.

## 2019-05-16 NOTE — PLAN OF CARE
VSS. Bleeding stable- see doc flow. Reports pain well managed with ibuprofen and ice packs. Pitocin per protocol. Eating and drinking without difficulty. Was able to void on 2nd attempt. Breast feeding well. Bonding with baby. Transferred to room 7142.

## 2019-05-16 NOTE — PLAN OF CARE
"VSS and postpartum assessment WDL. Patient declined all offers of pain medications; knows they are available at any time. Uterus U/1, firm, midline. Light lochia rubra. No signs of infection in laceration; using tucks and peribottle, but declined ice. PIV saline locked. Breastfeeding on cue with full assist for latch. Patient expressed frustration with early morning feed and getting good latch; having some pain. Declined hand expression, saying \"No, no! That hurts!\" Lactation consult is released. Patient is bonding well with baby and involved in cares.  services scheduled for this morning. Continue to monitor and support.   "

## 2019-05-16 NOTE — PLAN OF CARE
Pt presented to triage, ruptured, bleeding, and laboring; 8cm dilated. FHTs with prolonged decels and repetitive late decels. CNM and RN at bedside, repositioning, IV fluid bolus after PIV was placed, and 15L supplemental O2. Pt progressed rapidly to complete and delivered a viable baby boy at 1557 with NICU in attendance. APGARs 8/9. Laceration repaired by Dr Chang. Placenta to lab for suspected abruption. See doc flow for qbl. Plan to monitor bleeding carefully, routine postpartum mother and  cares.

## 2019-05-16 NOTE — PLAN OF CARE
Data: Amina Branham transferred to 7142 via wheelchair at 1925. Baby transferred via parent's arms.  Action: Receiving unit notified of transfer: Yes. Patient and family notified of room change. Report given to Mckenzie QUEZADA RN before transfer with update at bedside. Belongings sent to receiving unit. Accompanied by Registered Nurse. Oriented patient to surroundings. Call light within reach. ID bands double-checked with receiving RN.  Response: Patient tolerated transfer and is stable.

## 2019-05-16 NOTE — PLAN OF CARE
Patient arrived to [facility] unit via wheelchair at 1940,with belongings, accompanied by spouse/ significant other, with infant in arms. Received report from Robert MATOS RN  and checked bands. Unit and room orientation started. Call light given; no concerns present at this time. Continue with plan of care.

## 2019-05-16 NOTE — PLAN OF CARE
Data: Vital signs within normal limits. Postpartum checks within normal limits - see flow record. Patient eating and drinking normally. Patient able to empty bladder independently and is up ambulating. No apparent signs of infection. laceration  healing well. Patient performing self cares and is able to care for infant.  Action: Patient medicated during the shift for pain. See MAR. Patient reassessed within 1 hour after each medication and pain was improved - patient stated she was comfortable. Patient education done about   Breastfeeding and pain management See flow record.  Response: Positive attachment behaviors observed with infant. Support persons father of the baby present early this morning and will return tonight.   Plan: Anticipate discharge on 5/17.

## 2019-05-16 NOTE — PLAN OF CARE
"Oriented to room and unit routines.   VSS and postpartum assessments WDL.  Up ad fernandez with steady gait.  Bonding well with infant.  Breastfeeding on cue with full assist.  Provided education and assistance with hand expression and fingerfeeding expressed colostrum to infant while infant was on warmer.  Pain managed with ibuprofen.  PIV saline locked.  Voiding without difficulty.  , Mnia present and supportive for most of shift, went home overnight and will return around 0630 tomorrow morning.  Pt and  asking many questions and eager to learn \"how to care for their first baby\".  Will continue with postpartum cares and education per plan of care.  Pt requests to have her  interpret for her when he is present (waiver signed in chart),  services okay to use when he is not here.   "

## 2019-05-16 NOTE — PROGRESS NOTES
[facility], [place]    Post-Partum Progress Note    Assessment & Plan   Assessment:  Post-partum day #1  Normal spontaneous vaginal delivery   present during visit.      Doing well.  Normal healing perineum   No excessive bleeding  Pain well-controlled.  Tolerating activity well.  Breastfeeding is not going well, discussed with patient and lactation who was able to see patient. Patient will call when she is breastfeeding so she can get extra help  Discharge planning discussed with patient     Plan:  Ambulation encouraged  Breast feeding strategies discussed  Lactation consultation  Pain control measures as needed  Reportable signs and symptoms dicussed with the patient  Start iron supplementation  Anticipate discharge tomorrow  Discharge medications ordered   Follow up at 6 weeks postpartum   Unsure about birth control, discussed breastfeeding is not reliable birth control and discussed recommendation of 1 year between babies.     Olivia Null     Interval History   Doing well.  Pain is well-controlled.  No fevers.  No history of foul-smelling vaginal discharge.  Good appetite.  Denies chest pain, shortness of breath, nausea or vomiting.  Vaginal bleeding is similar to a heavy menstrual flow.  Ambulatory.  Breastfeeding needs work, lactation and nurses aware and will continue to provide support.     Medications     - MEDICATION INSTRUCTIONS -       NO Rho (D) immune globulin (RhoGam) needed - mother Rh POSITIVE       oxytocin in 0.9% NaCl       oxytocin in 0.9% NaCl         ferrous gluconate  324 mg Oral Daily with breakfast     prenatal multivitamin w/iron  1 tablet Oral Daily     senna-docusate  1 tablet Oral BID    Or     senna-docusate  2 tablet Oral BID     Tdap (tetanus-diphtheria-acell pertussis)  0.5 mL Intramuscular Once       Physical Exam   Temp: 98.3  F (36.8  C) Temp src: Oral BP: 102/73 Pulse: 86 Heart Rate: 82 Resp: 16 SpO2: 100 %      There were no  vitals filed for this visit.  Vital Signs with Ranges  Temp:  [97.6  F (36.4  C)-98.3  F (36.8  C)] 98.3  F (36.8  C)  Pulse:  [86] 86  Heart Rate:  [82-92] 82  Resp:  [16-18] 16  BP: ()/(64-78) 102/73  SpO2:  [100 %] 100 %  I/O last 3 completed shifts:  In: -   Out: 748 [Urine:1; Blood:747]    Uterine fundus is firm, non-tender and at the level of the umbilicus    Data   Recent Labs   Lab Test 05/15/19  1525   ABO B   RH Pos   AS Neg     Recent Labs   Lab Test 05/16/19  0551 05/15/19  1525   HGB 9.4* 11.8     No lab results found.    Olivia MASTERSON

## 2019-05-17 ENCOUNTER — OFFICE VISIT (OUTPATIENT)
Dept: INTERPRETER SERVICES | Facility: CLINIC | Age: 24
End: 2019-05-17

## 2019-05-17 VITALS
DIASTOLIC BLOOD PRESSURE: 63 MMHG | TEMPERATURE: 97.9 F | OXYGEN SATURATION: 100 % | HEART RATE: 86 BPM | RESPIRATION RATE: 18 BRPM | SYSTOLIC BLOOD PRESSURE: 92 MMHG

## 2019-05-17 PROCEDURE — T1013 SIGN LANG/ORAL INTERPRETER: HCPCS | Mod: U3

## 2019-05-17 PROCEDURE — 25000132 ZZH RX MED GY IP 250 OP 250 PS 637: Performed by: ADVANCED PRACTICE MIDWIFE

## 2019-05-17 RX ADMIN — SENNOSIDES AND DOCUSATE SODIUM 2 TABLET: 8.6; 5 TABLET ORAL at 15:59

## 2019-05-17 RX ADMIN — PRENATAL VIT W/ FE FUMARATE-FA TAB 27-0.8 MG 1 TABLET: 27-0.8 TAB at 15:58

## 2019-05-17 RX ADMIN — FERROUS GLUCONATE 324 MG: 324 TABLET ORAL at 15:58

## 2019-05-17 NOTE — DISCHARGE SUMMARY
Post Partum Note  SIGNIFICANT PROBLEMS:  Patient Active Problem List    Diagnosis Date Noted     Labor and delivery indication for care or intervention 05/15/2019     Priority: Medium     GBS (group B Streptococcus carrier), +RV culture, currently pregnant 04/23/2019     Priority: Medium     Needs prophylaxis in labor.        Language barrier 04/02/2019     Priority: Medium     Needs  for some medical words but speaks and understands some English. Okay with translators in clinic but declines  in hospital for birth.  understands and speaks English well and patient requested he translate for her.        Need for Tdap vaccination 04/02/2019     Priority: Medium     Declines tdap vaccination        Encounter for supervision of normal pregnancy, antepartum 02/14/2019     Priority: Medium     FOB: Mina  QUETA established by late ultrasound. Amina declined the 2nd ultrasound  R/t change of QUETA by late trimester ultrasound.            INTERVAL HISTORY:  BP 92/63   Pulse 86   Temp 97.9  F (36.6  C) (Oral)   Resp 18   LMP 08/30/2018   SpO2 100%   Breastfeeding? Unknown   Pt stable, baby is rooming in  Breast feeding status:initiated  Complications since 2 hours post delivery: None  Patient is tolerating acitivity well Voiding without difficulty, cramping is relieved by Ibuprophen, lochia is decreasing and patient denies clots.  Perineal pain is is relieved by Ibuprophen.  The perineum laceration is well approximated    Postpartum hemoglobin   Hemoglobin   Date Value Ref Range Status   05/16/2019 9.4 (L) 11.7 - 15.7 g/dL Final     Blood type   Lab Results   Component Value Date    ABO B 05/15/2019       Lab Results   Component Value Date    RH Pos 05/15/2019     Rubella status Rubella     ASSESSMENT/PLAN:  Normal postpartum exam   Stable Post-partum day #2  Complications:anemic, plan for iron supplementation  Plan d/c home today  RTC 6 weeks  Teaching done: D/C Instructions: Nutrition/Activity,  Engorgement Management, Birth Control Options, Warning Signs/When to Call: Excessive Bleeding, Infection, PP Depression, Kegals and Crunches, RTC Clinic for PP Appointment, PNV and Iron supplemenation  Birthcontrol planned:Undecided. Fertility and contraception options reviewed.  Current Discharge Medication List      START taking these medications    Details   acetaminophen (TYLENOL) 325 MG tablet Take 2 tablets (650 mg) by mouth every 6 hours as needed for mild pain Start after Delivery.  Qty: 100 tablet, Refills: 0    Associated Diagnoses:  (normal spontaneous vaginal delivery)      ferrous sulfate (FEROSUL) 325 (65 Fe) MG tablet Take 1 tablet (325 mg) by mouth daily (with breakfast)  Qty: 100 tablet, Refills: 0    Associated Diagnoses:  (normal spontaneous vaginal delivery)      ibuprofen (ADVIL/MOTRIN) 600 MG tablet Take 1 tablet (600 mg) by mouth every 6 hours as needed for moderate pain Start after delivery  Qty: 60 tablet, Refills: 0    Associated Diagnoses:  (normal spontaneous vaginal delivery)         CONTINUE these medications which have CHANGED    Details   Prenatal Vit-Fe Fumarate-FA (PRENATAL VITAMINS) 28-0.8 MG TABS Take 1 tablet by mouth daily  Qty: 100 tablet, Refills: 3    Associated Diagnoses:  (normal spontaneous vaginal delivery)      senna (SENOKOT) 8.6 MG tablet Take 1 tablet by mouth daily  Qty: 60 tablet, Refills: 0    Associated Diagnoses: Slow transit constipation         CONTINUE these medications which have NOT CHANGED    Details   vitamin D3 (CHOLECALCIFEROL) 2000 units tablet Take 1 tablet by mouth daily  Qty: 90 tablet, Refills: 3    Associated Diagnoses: Vitamin D deficiency         STOP taking these medications       ferrous gluconate (FERGON) 324 (38 Fe) MG tablet Comments:   Reason for Stopping:         polyethylene glycol (MIRALAX/GLYCOLAX) packet Comments:   Reason for Stopping:         Polyethylene Glycol 3350 (MIRALAX PO) Comments:   Reason for Stopping:              Reviewed laceration healing and the importance of avoiding constipation.  She has used Miralax in the past to avoid constipation.  She can use this again as needed.  She knows that she can call or come to the clinic for any additional concerns or questions.      Yanna Mak, DNP, APRN, CNM

## 2019-05-17 NOTE — PLAN OF CARE
VSS. Pt denying need for PRN pain medications. Pt breastfeeding baby independently. Pt also encouraged to pump 4-6 times per day to assist with bringing in full milk supply. Pt voiding and ambulating without difficulty. Soaked in tub once today. Postpartum checks WNL. Bonding well with baby. Excited to discharge to home. Discharge instructions and medications reviewed with . Baby bands verified. Pt planning to discharge to home around 1600 when baby able to discharge.

## 2019-05-17 NOTE — PLAN OF CARE
VSS. Postpartum assessment WNL. Denies any significant pain despite having 3rd degree perineal laceration. She took 2 stool softeners in the evening. She is not taking any pain medications. Refused PIV flush, so status of PIV unknown. Initiated pumping since Amina was unable to tolerate hand expression to supplement baby (infant < 6 lbs at birth). Amina did not tolerate pumping for more than 5 minutes before she said she was done with it. Used phone  to clarify feeding plan for baby. It sounds like she would like to breastfeed baby but is worried because she does not have milk yet. Explained process of milk supply and emphasized the importance of stimulation. Since we were not able to express 5cc with pump or hand expression, formula supplementation was initiated for baby. She appears to be bonding well with baby. Independent with changing diapers but does require some reminders to feed baby frequently.

## 2019-05-17 NOTE — DISCHARGE INSTRUCTIONS
Vaginal Delivery Discharge Instructions: [facility]  [place]:     Waydiiso qoyska iyo saaxibadaa inay ku caawiyaan markaad u baahantahay.    Waxba walter kor saarin ama walter galin farjigaaga ilaa uu dhakhtarkaagu ansixiyo.    Si fudud u qaado dhowrka asbuuc e xiga si aad u ogalaato in jirkaagu asia kabto. Waxaad samayn kartaa howl kasta oo aad rabto ilaa meeshaas laga gaarayo.    Gaadhi walter wadin markaad qaadanayso  kaniiniyada xanuunka ee dhkhatarku kuu qoray . waxaad gaadhi wadi kartaa haddii aad qaadanayso kaniiniyada xanuunka ee koontarka.    Wac bixiyahaaga daryeelka caafimaaad haddii aad qabtid mid ka mid ah calaamadahan asia socda:    Haddii suufka dhiigga nuuga uu ku buuxsamo 1 saac gudihiis, ama aad aragto xinjiro dhiig ah oo ka wayn kubadda golafka.     Dhiig soconaya wax kabadan 6 asbuuc.    Haddii aad qabto dheecaan farjiga ka imaanaya oo  si xun u uraya.     [facility]  F (38  C) am aka sii saraysa (heerkulka laga qaaday carabka hoostiiisa), oo qarqaryo leh ama aan lahayn     Xanuun, nabar, majiirid daran oo aad ka dareeto qaybta hoose ee ubucda.    Xanuun sii kordya, barar, guduudasho ama dheecaan ka dhiimaya meesha la tolay ee qaliinka.    Kaadi badan oo dagdag ah oo markasta ku qabanaysa , ama gubasho aad dareento markaad kaajayso.    Guduudasho, barar, ama xanuun aad ka dareento xididada lugta.    Dhibaato kaa haysata naas nuujinta, ama guduudasho ama xanuun naaska ah.    Xanuun sii kordhaya ama aan ka dhamaanayn meesha la tolay ama danqashada dilaaca.    Lalabbo ama matag.    Xabad xanuun iyo qufac ama naqaska oo kukatrin lazo.    Dhibaato ay la socoto murugjohanne, puja, aa erlinda.     Haddii aad qabtid wax erlinda ga oo ku saabscharisma inaad waxyeelayso naftaada ama ilmaha, wac duran frenchvicky mahoneyiibenito.     Haddii aad qabto escobedo aalo priyank frost noqoto kadib.    Gacmahaagga nadiifi:  Markasta dhaqa gacahaaga ka hor inta aadan taaban farjiga agagaarkiisa  iyo meesha la tolay. Trenton thomas  caawiniaysaa julius oquendoado infakshanku. Hdii gacmahaagu hammad yeung gacmaha santosh tirtirmelanie patterson u nadiifiso gacmahaaga. Raúlimark nadiifi ooo jar.      Vaginal Delivery Discharge Instructions  Activity:     Ask family and friends for help when you need it.    Do not place anything in your vagina until your doctor approves.    Take it easy for the next few weeks to allow your body to recover. You may do any activities you feel up to at that point.    Do not drive while taking pain pills prescribed by your doctor. You may drive if taking over-the-counter pain pills.    Call your health care provider if you have any of these symptoms:    You soak a sanitary pad with blood within 1 hour, or you see blood clots larger than a golf ball.    Bleeding that lasts more than 6 weeks.    You have vaginal discharge that smells bad.     A fever of 100.4  F (38  C) or higher (temperature taken under your tongue), with or without chills     Severe, pain, cramping or tenderness in your lower belly area.    Increased pain, swelling, redness or fluid around your stitches.    A more frequent or urgent need to urinate (pee), or it burns when you pee.    Redness, swelling or pain around a vein in your leg.    Problems breastfeeding, or a red or painful area on your breast.    Pain that increases or does not go away from an episiotomy or perineal tear.    Nausea and vomiting.    Chest pain and cough or are gasping for air.    Problems coping with sadness, anxiety, or depression.     If you have any concerns about hurting yourself or the baby, call your doctor right away.      You have questions or concerns after you return home.    Keep your hands clean:  Always wash your hands before touching your perineal area and stitches.  This helps reduce your risk of infection.  If your hands aren t dirty, you may use an alcohol hand-rub to clean your hands. Keep your nails clean and short.

## 2019-06-01 ENCOUNTER — NURSE TRIAGE (OUTPATIENT)
Dept: NURSING | Facility: CLINIC | Age: 24
End: 2019-06-01

## 2019-06-01 NOTE — TELEPHONE ENCOUNTER
He said he will try and get her to the ER.  Chelle Valenzuela RN-[facility]      Reason for Disposition    Upper abdominal pain lasts > 1 hour    Additional Information    Negative: Severe difficulty breathing (e.g., struggling for each breath, speaks in single words)    Negative: Looks like a broken bone or dislocated joint (e.g., crooked or deformed)    Negative: Sounds like a life-threatening emergency to the triager    Negative: Chest pain    Negative: Followed a leg injury    Negative: [1] Small area of swelling AND [2] followed an insect bite to the area    Negative: Swelling of one ankle joint    Negative: Swelling of knee is main symptom    Negative: Pregnant    Postpartum (< 1 month since delivery)    Negative: Sounds like a life-threatening emergency to the triager    Negative: Severe difficulty breathing (e.g., struggling for each breath, speaks in single words)    Negative: Followed a leg injury    Negative: [1] Small area of swelling AND [2] followed an insect bite to the area    Negative: Swelling only of ankle    Negative: Swelling only of knee    Negative: Chest pain    Negative: [1] Difficulty breathing AND [2] new onset or worsening    Negative: SEVERE leg swelling (e.g., swelling extends above knee, entire leg is swollen)    Negative: New blurred vision or vision changes    Negative: Severe headache    Protocols used: POSTPARTUM - LEG SWELLING AND EDEMA-A-AH, LEG SWELLING AND EDEMA-A-AH

## 2019-06-10 LAB — COPATH REPORT: NORMAL

## 2019-07-23 PROBLEM — Z23 NEED FOR TDAP VACCINATION: Status: RESOLVED | Noted: 2019-04-02 | Resolved: 2019-07-23

## 2019-07-23 PROBLEM — Z34.90 ENCOUNTER FOR SUPERVISION OF NORMAL PREGNANCY, ANTEPARTUM: Status: RESOLVED | Noted: 2019-02-14 | Resolved: 2019-07-23

## 2019-07-23 PROBLEM — O99.820 GBS (GROUP B STREPTOCOCCUS CARRIER), +RV CULTURE, CURRENTLY PREGNANT: Status: RESOLVED | Noted: 2019-04-23 | Resolved: 2019-07-23

## 2019-07-24 ENCOUNTER — OFFICE VISIT (OUTPATIENT)
Dept: MIDWIFE SERVICES | Facility: CLINIC | Age: 24
End: 2019-07-24
Payer: COMMERCIAL

## 2019-07-24 VITALS
SYSTOLIC BLOOD PRESSURE: 98 MMHG | HEART RATE: 77 BPM | BODY MASS INDEX: 28.27 KG/M2 | WEIGHT: 169.7 LBS | HEIGHT: 65 IN | DIASTOLIC BLOOD PRESSURE: 72 MMHG

## 2019-07-24 DIAGNOSIS — Z00.00 WELL WOMAN EXAM WITHOUT GYNECOLOGICAL EXAM: Primary | ICD-10-CM

## 2019-07-24 PROCEDURE — 99395 PREV VISIT EST AGE 18-39: CPT | Performed by: ADVANCED PRACTICE MIDWIFE

## 2019-07-24 RX ORDER — PRENATAL VIT/IRON FUM/FOLIC AC 27MG-0.8MG
1 TABLET ORAL DAILY
Qty: 100 TABLET | Refills: 3 | Status: SHIPPED | OUTPATIENT
Start: 2019-07-24

## 2019-07-24 RX ORDER — ACETAMINOPHEN AND CODEINE PHOSPHATE 120; 12 MG/5ML; MG/5ML
0.35 SOLUTION ORAL DAILY
Qty: 84 TABLET | Refills: 3 | Status: SHIPPED | OUTPATIENT
Start: 2019-07-24

## 2019-07-24 ASSESSMENT — MIFFLIN-ST. JEOR: SCORE: 1507.69

## 2019-07-24 NOTE — NURSING NOTE
"Chief Complaint   Patient presents with     Physical       Initial BP 98/72   Pulse 77   Ht 1.638 m (5' 4.5\")   Wt 77 kg (169 lb 11.2 oz)   LMP 07/10/2019 (Approximate)   BMI 28.68 kg/m   Estimated body mass index is 28.68 kg/m  as calculated from the following:    Height as of this encounter: 1.638 m (5' 4.5\").    Weight as of this encounter: 77 kg (169 lb 11.2 oz).  BP completed using cuff size: regular    Questioned patient about current smoking habits.  Pt. has never smoked.          The following HM Due: NONE      The following patient reported/Care Every where data was sent to:  P ABSTRACT QUALITY INITIATIVES [60191]        patient has appointment for today  Marla Grant                "

## 2019-07-24 NOTE — PROGRESS NOTES
"Amina is a 23 year old  female who presents for annual exam.     Menses are regular q 28-30 days and normal lasting 4 days.  Menses flow: normal.  Patient's last menstrual period was 07/10/2019 (approximate).. Using none for contraception.  She is not currently considering pregnancy.  Besides routine health maintenance, she has no other health concerns today .  Patient here with  and [place] .   Patient is here for birth control pills and refill of prenatal vitamins.   Pt wants to breastfeed for 2 years and get pregnant again.   Pt is breastfeeding every 2-4 hours.     GYNECOLOGIC HISTORY:  Menarche: \"doesn't remember\"  Age at first intercourse: 21 Number of lifetime partners: just one  Amina is sexually active with male partner(s) and is currently in monogamous relationship with .    History sexually transmitted infections:No STD history  STI testing offered?  Declined  INGRIS exposure: Unknown  History of abnormal Pap smear: NO - age 21-29 PAP every 3 years recommended  Family history of breast CA: No  Family history of uterine/ovarian CA: No    Family history of colon CA: No    HEALTH MAINTENANCE:  Cholesterol: (No results found for: CHOL History of abnormal lipids: No  Mammo: NA . History of abnormal Mammo: Not applicable.  Regular Self Breast Exams: Yes  Calcium/Vitamin D intake: source:  dairy, dietary supplement(s) Adequate? Yes  TSH: (No results found for: TSH )  Pap; (No results found for: PAP )    HISTORY:  OB History    Para Term  AB Living   1 1 1 0 0 1   SAB TAB Ectopic Multiple Live Births   0 0 0 0 1      # Outcome Date GA Lbr Manohar/2nd Weight Sex Delivery Anes PTL Lv   1 Term 05/15/19 40w4d 06:20 / 00:37 2.72 kg (5 lb 15.9 oz) M Vag-Spont None N TODD      Complications: Excessive Vaginal Bleeding, GBS      Name: SHERYL VINSON      Apgar1: 8  Apgar5: 9     Past Medical History:   Diagnosis Date     NO ACTIVE PROBLEMS      Past Surgical History:   Procedure " Laterality Date     NO HISTORY OF SURGERY       No family history on file.  Social History     Socioeconomic History     Marital status:      Spouse name: Not on file     Number of children: Not on file     Years of education: Not on file     Highest education level: Not on file   Occupational History     Not on file   Social Needs     Financial resource strain: Not on file     Food insecurity:     Worry: Not on file     Inability: Not on file     Transportation needs:     Medical: Not on file     Non-medical: Not on file   Tobacco Use     Smoking status: Never Smoker     Smokeless tobacco: Never Used   Substance and Sexual Activity     Alcohol use: No     Frequency: Never     Drug use: No     Sexual activity: Never   Lifestyle     Physical activity:     Days per week: 0 days     Minutes per session: 0 min     Stress: Not at all   Relationships     Social connections:     Talks on phone: Not on file     Gets together: Not on file     Attends Hindu service: Not on file     Active member of club or organization: Not on file     Attends meetings of clubs or organizations: Not on file     Relationship status:      Intimate partner violence:     Fear of current or ex partner: No     Emotionally abused: No     Physically abused: No     Forced sexual activity: No   Other Topics Concern     Not on file   Social History Narrative     Not on file       Current Outpatient Medications:      acetaminophen (TYLENOL) 325 MG tablet, Take 2 tablets (650 mg) by mouth every 6 hours as needed for mild pain Start after Delivery., Disp: 100 tablet, Rfl: 0     ferrous sulfate (FEROSUL) 325 (65 Fe) MG tablet, Take 1 tablet (325 mg) by mouth daily (with breakfast), Disp: 100 tablet, Rfl: 0     ibuprofen (ADVIL/MOTRIN) 600 MG tablet, Take 1 tablet (600 mg) by mouth every 6 hours as needed for moderate pain Start after delivery, Disp: 60 tablet, Rfl: 0     Prenatal Vit-Fe Fumarate-FA (PRENATAL VITAMINS) 28-0.8 MG TABS, Take  "1 tablet by mouth daily, Disp: 100 tablet, Rfl: 3     senna (SENOKOT) 8.6 MG tablet, Take 1 tablet by mouth daily, Disp: 60 tablet, Rfl: 0     vitamin D3 (CHOLECALCIFEROL) 2000 units tablet, Take 1 tablet by mouth daily, Disp: 90 tablet, Rfl: 3   No Known Allergies    Past medical, surgical, social and family history were reviewed and updated in EPIC.    ROS:   C:     NEGATIVE for fever, chills, change in weight  I:       NEGATIVE for worrisome rashes, moles or lesions  E:     NEGATIVE for vision changes or irritation  E/M: NEGATIVE for ear, mouth and throat problems  R:     NEGATIVE for significant cough or SOB  CV:   NEGATIVE for chest pain, palpitations or peripheral edema  GI:     NEGATIVE for nausea, abdominal pain, heartburn, or change in bowel habits  :   NEGATIVE for frequency, dysuria, hematuria, vaginal discharge, or irregular bleeding  M:     NEGATIVE for significant arthralgias or myalgia  N:      NEGATIVE for weakness, dizziness or paresthesias  E:      NEGATIVE for temperature intolerance, skin/hair changes  P:      NEGATIVE for changes in mood or affect.    EXAM:  BP 98/72   Pulse 77   Ht 1.638 m (5' 4.5\")   Wt 77 kg (169 lb 11.2 oz)   LMP 07/10/2019 (Approximate)   BMI 28.68 kg/m     BMI: Body mass index is 28.68 kg/m .  Constitutional: healthy, alert and no distress  Head: Normocephalic. No masses, lesions, tenderness or abnormalities  Neck: Neck supple. Trachea midline. No adenopathy. Thyroid symmetric, normal size.   Cardiovascular: RRR.   Respiratory: Negative.   Breast: Deferred patient breastfeeding every 2-4 hours, filling   Gastrointestinal: Abdomen soft, non-tender, non-distended. No masses, organomegaly.  :  PELVIC deferred   Musculoskeletal: extremities normal  Skin: no suspicious lesions or rashes  Psychiatric: Affect appropriate, cooperative,mentation appears normal.     COUNSELING:   Reviewed preventive health counseling, as reflected in patient instructions  Special attention " given to:        Regular exercise       Healthy diet/nutrition       Contraception       Family planning   reports that she has never smoked. She has never used smokeless tobacco.    Body mass index is 28.68 kg/m .  Weight management plan: Discussed healthy diet and exercise guidelines  FRAX Risk Assessment    ASSESSMENT:  23 year old female with satisfactory annual exam  Well woman without gyn exam  Birth control counseling      PLAN;  Patient here with , does need birth control and would like pills. Reviewed minipill and taking every day at same time every day.  Pt has had 2 periods since delivery strongly encouraged ot take minipills every day.   Aware may cause some spotting.    If pt stops breastfeedign she should call and get regular birth control pills  Pt states she will likely breastfeed for 2 years and get pregnant again.  Would like prenatal vitamins too.  Gracie Callahan CNM

## 2020-03-11 ENCOUNTER — HEALTH MAINTENANCE LETTER (OUTPATIENT)
Age: 25
End: 2020-03-11

## 2021-01-03 ENCOUNTER — HEALTH MAINTENANCE LETTER (OUTPATIENT)
Age: 26
End: 2021-01-03

## 2021-03-07 ENCOUNTER — HEALTH MAINTENANCE LETTER (OUTPATIENT)
Age: 26
End: 2021-03-07

## 2021-10-10 ENCOUNTER — HEALTH MAINTENANCE LETTER (OUTPATIENT)
Age: 26
End: 2021-10-10

## 2022-01-29 ENCOUNTER — HEALTH MAINTENANCE LETTER (OUTPATIENT)
Age: 27
End: 2022-01-29

## 2022-09-18 ENCOUNTER — HEALTH MAINTENANCE LETTER (OUTPATIENT)
Age: 27
End: 2022-09-18

## 2023-05-07 ENCOUNTER — HEALTH MAINTENANCE LETTER (OUTPATIENT)
Age: 28
End: 2023-05-07